# Patient Record
(demographics unavailable — no encounter records)

---

## 2025-05-12 NOTE — ASSESSMENT
[FreeTextEntry1] : EKG 12/4/2023- Sinus Tachycardia  WITHIN NORMAL LIMITS Regular stress test January 10, 2024: Equivocal study.  Please refer to the report for details  Echocardiogram January 10, 2024: LVEF 60 to 65%.  Anechoic structure noted in the liver probably liver cyst  Echocardiogram: EF of 60 %, with no significant valvular abnormality March 2024,NST - Normal myocardial perfusion scan, with no evidence of infarction or inducible ischemia  EKG 5/12/2025- Sinus Rhythm  Low voltage in precordial leads.  Assessment: 1.  Syncope- No recurrent episodes 2.  Abnormal regular stress test- Nuclear Stress Test is normal. Patient is asymptomatic. 3.   Recommendations: Since patient remains asymptomatic, patient's blood pressure is monitored by his primary care physician.  No need for patient to have a scheduled cardiology follow-up.  Patient advised to return on a as needed basis or if she is recommended by her PCP.  Patient verbalized her understanding

## 2025-05-12 NOTE — HISTORY OF PRESENT ILLNESS
[FreeTextEntry1] : : Y[place]ut language: [employer]: ID number [id number].  Lita  HPI: Patient is a 74-year-old female who was seen at [facility] ER on November 28, 2023 for an episode of syncope.  Patient was at work walking and then suddenly fell and struck her head on the ground sustaining a laceration on her right forehead.  In the ER it states that patient denied having loss of consciousness.  She remembers the entire event.  Patient had negative troponins.  Normal D-dimer.  Patient's chest x-ray was unremarkable. Patient is physically active denies any exertional chest pain, dyspnea palpitations.  Patient denies orthopnea, PND or leg edema  Today 5/12/2025, presents for a follow-up visit.    Patient has not had any recurrent episodes of fainting.  No change in her medical history. Patient is completely asymptomatic.   PMH: Hyperlipidemia.  No diabetes no prior CAD or CHF.  No TIA or CVA.  Social History: Non-smoker.  Denies any alcohol or substance abuse.  Family history: Noncontributory

## 2025-05-12 NOTE — PHYSICAL EXAM
